# Patient Record
Sex: FEMALE | Race: WHITE | NOT HISPANIC OR LATINO | Employment: FULL TIME | URBAN - METROPOLITAN AREA
[De-identification: names, ages, dates, MRNs, and addresses within clinical notes are randomized per-mention and may not be internally consistent; named-entity substitution may affect disease eponyms.]

---

## 2024-06-24 ENCOUNTER — TELEPHONE (OUTPATIENT)
Age: 54
End: 2024-06-24

## 2024-06-24 NOTE — TELEPHONE ENCOUNTER
Received call from patient asking to [facility] consult with Dr Sarmiento for a double mastectomy. She stated that she carries the BCRA gene.

## 2024-06-25 ENCOUNTER — TELEPHONE (OUTPATIENT)
Dept: PLASTIC SURGERY | Facility: CLINIC | Age: 54
End: 2024-06-25

## 2024-07-05 ENCOUNTER — CONSULT (OUTPATIENT)
Dept: PLASTIC SURGERY | Facility: CLINIC | Age: 54
End: 2024-07-05

## 2024-07-05 VITALS
BODY MASS INDEX: 39.32 KG/M2 | TEMPERATURE: 99.3 F | HEIGHT: 65 IN | SYSTOLIC BLOOD PRESSURE: 124 MMHG | DIASTOLIC BLOOD PRESSURE: 80 MMHG | WEIGHT: 236 LBS

## 2024-07-05 DIAGNOSIS — Z15.02 BRCA1 GENE MUTATION POSITIVE IN FEMALE: Primary | ICD-10-CM

## 2024-07-05 DIAGNOSIS — Z85.3 HISTORY OF LEFT BREAST CANCER: ICD-10-CM

## 2024-07-05 DIAGNOSIS — Z98.890 S/P BILATERAL BREAST REDUCTION: ICD-10-CM

## 2024-07-05 DIAGNOSIS — Z15.01 BRCA1 GENE MUTATION POSITIVE IN FEMALE: Primary | ICD-10-CM

## 2024-07-05 DIAGNOSIS — Z15.09 BRCA1 GENE MUTATION POSITIVE IN FEMALE: Primary | ICD-10-CM

## 2024-07-05 DIAGNOSIS — Z92.3 HISTORY OF RADIATION THERAPY: ICD-10-CM

## 2024-07-05 PROCEDURE — 99205 OFFICE O/P NEW HI 60 MIN: CPT | Performed by: PLASTIC SURGERY

## 2024-07-05 RX ORDER — FLUTICASONE PROPIONATE 50 MCG
2 SPRAY, SUSPENSION (ML) NASAL DAILY
COMMUNITY
Start: 2024-04-22

## 2024-07-05 RX ORDER — LISINOPRIL 10 MG/1
10 TABLET ORAL DAILY
COMMUNITY
Start: 2024-05-09

## 2024-07-05 RX ORDER — CIPROFLOXACIN 250 MG/1
250 TABLET, FILM COATED ORAL EVERY 12 HOURS SCHEDULED
COMMUNITY
Start: 2024-05-29

## 2024-07-05 RX ORDER — LEVOTHYROXINE SODIUM 0.03 MG/1
25 TABLET ORAL DAILY
COMMUNITY
Start: 2024-04-13

## 2024-07-05 RX ORDER — LORAZEPAM 0.5 MG/1
1 TABLET ORAL DAILY PRN
COMMUNITY
Start: 2024-05-28

## 2024-07-05 RX ORDER — SERTRALINE HYDROCHLORIDE 25 MG/1
25 TABLET, FILM COATED ORAL DAILY
COMMUNITY
Start: 2024-06-21

## 2024-07-05 RX ORDER — SERTRALINE HYDROCHLORIDE 100 MG/1
100 TABLET, FILM COATED ORAL DAILY
COMMUNITY
Start: 2024-05-14

## 2024-07-05 NOTE — PROGRESS NOTES
Plastic Surgery H&P  [facility]   [address] [place] [address]        Assessment/Plan:    Assessment:   1. BRCA 1 gene mutation  2. Hx of left breast cancer S/P lumpectomy and RT  3. S/P B/L breast reduction        Plan:   I had a lengthy discussion with the patient regarding her reconstructive options. We mainly discussed implant based reconstruction, as she had  previously tissue expander reconstruction with [facility].  I discussed immediate versus delayed versus no reconstruction/use of prosthetics.  I discussed the breast reconstruction is a multi staged process.  I discussed multiple variations of each type.  I then discussed the potential risks, benefits and alternatives, including operative and recovery time of each procedure.  Risks discussed included, but were not limited to: bleeding, infection, scarring, poor wound healing, demonstrating underlying structures, need for further surgery, need for multiple procedures, mastectomy flap necrosis, partial mastectomy flap necrosis, the off-label use of acellular dermal matrix, the risk of breast implant associated anaplastic large cell lymphoma, the possible need for the expansion process, the risk of seroma, the risk of DVT, risk of asymmetry, the need for multiple procedures, the need for revision, poor aesthetic result, asymmetry.  The patient has had a prior breast reduction and desires nipple preservation. I believe is an appropriate candidate for nipple sparing mastectomies with pre-pectoral direct to implant reconstruction. She understands the likely need for secondary surgery for revisions, fat grafting, etc.  The patient noted her understanding. All the patient's questions were answered to her satisfaction.  She desires to proceed. I will refer her to breast surg onc for evaluation and coordination. We will work on scheduling. She is always welcome to call or return with any additional questions or  concerns.    Subjective      Sarah Singh is a 54 y.o. female who presents for evaluation for possible breast reconstruction. She is known to me from [facility]. 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 Patient underwent left partial mastectomy and SLNB on 3/2/17. She then received adjuvant whole breast RT from 4/3/17-17.   Patient is S/P B/L risk reducing breast reduction on 10/13/20. The patient is doing well. She follows with Surgical Oncology for surveillance and undergoes yearly mammograms and breast MRIs. Due to her high risk for breast cancer, patient is interested in prophylactic bilateral mastectomies with breast reconstruction. She states she is finally ready and was scheduled at [facility], but was uncomfortable with the reconstructive plan of skin sparing mastectomies with tissue expanders. She is interested in nipple sparing mastectomies. Patient has no related complaints. She denies new breast masses and breast pain. Patient has a PMH of hypothyroidism and HTN, anxiety/depression due to death of her son 2 years ago. She did have an abnormal EKG during her pre-op work up. She is seeing Cardiology, her stress test was normal. ECHO pending. She denies chest pain, SOB.     Past Medical History:   Diagnosis Date   BRCA1 gene mutation positive 2017   Breast cancer (HCC) 2017   lt ca with rad DCIS   DCIS (ductal carcinoma in situ) of breast   Hypothyroid   Sigmoid diverticulitis     Past Surgical History:   Procedure Laterality Date    DELIVERY      COLONOSCOPY, DIAGNOSTIC (RECTUM) 2020   COLONOSCOPY FLEXIBLE PROXIMAL DIAGNOSTIC performed by Lan Holly MD at ENDOSCOPY [facility]   COLONOSCOPY, DIAGNOSTIC (RECTUM) 2021   COLONOSCOPY FLEXIBLE PROXIMAL DIAGNOSTIC performed by Lan Holly MD at ENDOSCOPY [facility]   DILATION AND CURETTAGE (D&C)    for 40 day mesnes   EXC BREAST LESION RADMARK Left  3/2/2017   EXCISION OF BREAST LESION RADIOLOGICAL MARKER performed by Diane Bright DO at OR [facility]   LAPAROSCOPY; CHOLECYSTECTOMY 2010   no complications   LAPAROSCOPY;RMV ADNEXAL STRUCT Bilateral 8/31/2017   LAPAROSCOPIC OOPHORECTOMY AND OR SALPINGECTOMY performed by Lan Palm MD at OR [facility]   MASTECTOMY, PARTIAL Left 3/2/2017   MASTECTOMY PARTIAL performed by Diane Bright DO at OR [facility]   RADIATION THERAPY Left 2017   REDUCTION OF BREAST Bilateral 10/13/2020   REDUCTION MAMMOPLASTY performed by Savannah Sarmiento MD at OR [facility]   REMOVAL OF APPENDIX   Appendectomy       Current Outpatient Medications:   •  ciprofloxacin (CIPRO) 250 mg tablet, Take 250 mg by mouth every 12 (twelve) hours, Disp: , Rfl:   •  fluticasone (FLONASE) 50 mcg/act nasal spray, 2 sprays into each nostril daily, Disp: , Rfl:   •  levothyroxine 25 mcg tablet, Take 25 mcg by mouth daily, Disp: , Rfl:   •  lisinopril (ZESTRIL) 10 mg tablet, Take 10 mg by mouth daily, Disp: , Rfl:   •  LORazepam (ATIVAN) 0.5 mg tablet, Take 1 tablet by mouth daily as needed, Disp: , Rfl:   •  sertraline (ZOLOFT) 100 mg tablet, Take 100 mg by mouth daily, Disp: , Rfl:   •  sertraline (ZOLOFT) 25 mg tablet, Take 25 mg by mouth daily, Disp: , Rfl:       Allergies   Allergen Reactions   • Medical Tape Other (See Comments)     SKIN GETS REDDENED       Social History     Socioeconomic History   • Marital status: /Civil Union     Spouse name: Not on file   • Number of children: Not on file   • Years of education: Not on file   • Highest education level: Not on file   Occupational History   • Not on file   Tobacco Use   • Smoking status: Never   • Smokeless tobacco: Never   Vaping Use   • Vaping status: Never Used   Substance and Sexual Activity   • Alcohol use: Not Currently     Comment: rare   • Drug use: Not on file   • Sexual activity: Never   Other Topics Concern   • Not on file   Social History Narrative   • Not on file     Social  "Determinants of Health     Financial Resource Strain: Not on file   Food Insecurity: No Food Insecurity (9/13/2023)    Received from [facility]    Hunger Vital Sign    • Worried About Running Out of Food in the Last Year: Never true    • Ran Out of Food in the Last Year: Never true   Transportation Needs: Not on file   Physical Activity: Not on file   Stress: Not on file   Social Connections: Not on file   Intimate Partner Violence: Not on file   Housing Stability: Not on file     Family History   Problem Relation Name Age of Onset   Gastro-intestinal disorder Mother   colitis, diverticulitis that required colostomy, multiple polyps   Hypertension Father   BRCA1 Positive Father   No Known Problems Sister   No Known Problems Sister   No Known Problems Brother   Diabetes Grandmother (Maternal)   Glaucoma Grandmother (Maternal)   Stroke Grandmother (Maternal)   Cancer Grandfather (Maternal)   pancreatic   Cancer Grandmother (Paternal)   uterine, ?ovarian   BRCA1 Positive Grandmother (Paternal)   suspected carrier   No Known Problems Grandfather (Paternal)   Asthma Daughter   Asthma Daughter   No Known Problems Son   BRCA1 Positive Great-grandmother (Paternal)   Diabetes Aunt (Paternal)   Ovarian cancer Aunt (Paternal)   BRCA1 Positive Aunt (Paternal)     Review of Systems  Pertinent items are noted in HPI.      Objective     /80 (BP Location: Right arm, Patient Position: Sitting, Cuff Size: Large)   Temp 99.3 °F (37.4 °C) (Tympanic)   Ht 5' 5\" (1.651 m)   Wt 107 kg (236 lb)   BMI 39.27 kg/m²     General:  alert and oriented, in no acute distress   Skin:  normal and no rash or abnormalities   Eyes: conjunctivae/corneas clear. PERRL, EOM's intact. Fundi benign.   Mouth: MMM no lesions   Lymph Nodes:  Cervical, supraclavicular, and axillary nodes normal.   Lungs:  clear to auscultation bilaterally   Heart:  regular rate and rhythm, S1, S2 normal, no murmur, click, rub or gallop and regular rate and rhythm "   Abdomen: soft, non-tender; bowel sounds normal; no masses,  no organomegaly   CVA:  absent   Genitourinary: defer exam   Extremities:  extremities normal, warm and well-perfused; no cyanosis, clubbing, or edema   Neurologic:  Alert and oriented x3. Gait normal. Reflexes and motor strength normal and symmetric. Cranial nerves 2-12 and sensation grossly intact.   Psychiatric:  normal mood, behavior, speech, dress, and thought processes        B/L breasts: soft, no masses palp, no axillary adenopathy palp, large volume, grade I ptosis. Minimal radiation changes to left breast.     IMAGING:  MAMMOGRAM SCREENING SASHA BILATERAL     History   Visit for screening mammogram   Family medical history includes BRCA1 Positive in 4 relatives (aunt   (paternal), father, grandmother (paternal) (comments: suspected carrier),   great-grandmother (paternal)) and ovarian cancer in aunt (paternal).     Films Compared   05/15/2023 MAMMOGRAM SCREENING SASHA BILATERAL, 05/25/2022 MAMMOGRAM   DIAGNOSTIC SASHA BILATERAL, 05/13/2022 MAMMOGRAM SCREENING SASHA BILATERAL,   05/12/2021 MAMMOGRAM SCREENING SASHA BILATERAL, 02/12/2020 MAMMOGRAM   DIAGNOSTIC BILATERAL, 02/11/2019 MAMMOGRAM DIAGNOSTIC BILATERAL,   08/06/2018 MRI BREAST BILATERAL W WO CONTRAST, 02/13/2017 MAMMOGRAM,   DIAGNOSTIC, UNILAT, 02/08/2017 MAMMOGRAM, SCREENING, BILAT, and 02/20/2015   MAMMOGRAM, SCREENING, BILAT     Findings   Left   The left breast is almost entirely fatty.   There are post-surgical findings from a previous lumpectomy with radiation   seen in the left breast. There are postsurgical changes of left breast   reduction. There is no evidence of suspicious masses, calcifications, or   other abnormal findings in the left breast.     Right   The right breast is almost entirely fatty. There are postsurgical changes   of right breast reduction. There is no evidence of suspicious masses,   calcifications, or other abnormal findings in the right breast.     Impression    Bilateral   No mammographic evidence of malignancy.     BI-RADS® Category: 2 - Benign.     Recommendation   Screening mammogram in 1 year is recommended for both breasts.       A total of 60 mins was spent on the encounter, including reviewing the patient's records, documentation, and time spent in counseling coordination of care which represent greater than 50% of the visit. 35 mins was spent in counseling and discussion of surgical procedures.

## 2024-08-21 ENCOUNTER — PATIENT MESSAGE (OUTPATIENT)
Dept: PLASTIC SURGERY | Facility: CLINIC | Age: 54
End: 2024-08-21

## 2024-09-11 ENCOUNTER — TELEPHONE (OUTPATIENT)
Dept: HEMATOLOGY ONCOLOGY | Facility: CLINIC | Age: 54
End: 2024-09-11

## 2024-09-11 NOTE — TELEPHONE ENCOUNTER
Referral to Surgical Oncology received.  Chart reviewed by oncology nurse navigator at this time.      Diagnosis:   Diagnosis   Z15.01, Z15.02, Z15.09 (ICD-10-CM) - BRCA1 gene mutation positive in female     After review of chart, instructions for scheduling added to referral and sent to be scheduled as advised.    Due to diagnosis on patient's referral, no records retrieval needed by Oncology Navigation at this time.  Patient's genetic testing results are uploaded in the media tab for date 7-

## 2024-10-21 PROBLEM — Z15.09 MONOALLELIC MUTATION OF BRCA1 GENE: Status: ACTIVE | Noted: 2024-10-21

## 2024-10-21 PROBLEM — D05.12 DUCTAL CARCINOMA IN SITU (DCIS) OF LEFT BREAST: Status: ACTIVE | Noted: 2024-10-21

## 2024-10-21 PROBLEM — Z15.01 MONOALLELIC MUTATION OF BRCA1 GENE: Status: ACTIVE | Noted: 2024-10-21

## 2024-10-21 PROBLEM — Z86.000 HISTORY OF DUCTAL CARCINOMA IN SITU (DCIS) OF LEFT BREAST: Status: ACTIVE | Noted: 2024-10-21

## 2024-10-23 ENCOUNTER — OFFICE VISIT (OUTPATIENT)
Dept: SURGICAL ONCOLOGY | Facility: CLINIC | Age: 54
End: 2024-10-23
Payer: COMMERCIAL

## 2024-10-23 VITALS
DIASTOLIC BLOOD PRESSURE: 80 MMHG | HEIGHT: 65 IN | TEMPERATURE: 97.6 F | HEART RATE: 64 BPM | WEIGHT: 239 LBS | SYSTOLIC BLOOD PRESSURE: 128 MMHG | BODY MASS INDEX: 39.82 KG/M2 | OXYGEN SATURATION: 96 %

## 2024-10-23 DIAGNOSIS — Z15.01 BRCA1 POSITIVE: Primary | ICD-10-CM

## 2024-10-23 DIAGNOSIS — Z15.01 MONOALLELIC MUTATION OF BRCA1 GENE: ICD-10-CM

## 2024-10-23 DIAGNOSIS — Z15.09 MONOALLELIC MUTATION OF BRCA1 GENE: ICD-10-CM

## 2024-10-23 DIAGNOSIS — Z98.890 S/P LUMPECTOMY, LEFT BREAST: ICD-10-CM

## 2024-10-23 DIAGNOSIS — Z86.000 HISTORY OF DUCTAL CARCINOMA IN SITU (DCIS) OF LEFT BREAST: ICD-10-CM

## 2024-10-23 DIAGNOSIS — Z15.09 BRCA1 POSITIVE: Primary | ICD-10-CM

## 2024-10-23 DIAGNOSIS — Z98.890 S/P BILATERAL BREAST REDUCTION: ICD-10-CM

## 2024-10-23 DIAGNOSIS — Z92.3 HISTORY OF THERAPEUTIC RADIATION: ICD-10-CM

## 2024-10-23 PROCEDURE — 99205 OFFICE O/P NEW HI 60 MIN: CPT | Performed by: SURGERY

## 2024-10-23 RX ORDER — IBUPROFEN 200 MG
200 TABLET ORAL
COMMUNITY

## 2024-10-23 RX ORDER — CEFAZOLIN SODIUM 2 G/50ML
2000 SOLUTION INTRAVENOUS ONCE
OUTPATIENT
Start: 2024-10-23 | End: 2024-10-23

## 2024-10-23 RX ORDER — ALPRAZOLAM 0.25 MG/1
0.25 TABLET ORAL DAILY PRN
COMMUNITY
Start: 2024-09-25

## 2024-10-23 RX ORDER — ENOXAPARIN SODIUM 100 MG/ML
40 INJECTION SUBCUTANEOUS ONCE
OUTPATIENT
Start: 2024-10-23 | End: 2024-10-23

## 2024-10-23 NOTE — PROGRESS NOTES
Breast Consultation-Surgical Oncology     [address]  [facility]  [address]  [facility] [address]    Name:  Sarah Singh  YOB: 1970  MRN:  [id number]    Assessment & Plan   Diagnoses and all orders for this visit:    BRCA1 positive    Monoallelic mutation of BRCA1 gene  -     Incentive spirometry; Standing  -     Insert and maintain IV line; Standing  -     Void On-Call to O.R.; Standing  -     Place sequential compression device; Standing  -     Case request operating room: Bilateral mastectomy, possible nipple sparring; Standing  -     UA w Reflex to Microscopic w Reflex to Culture; Future  -     Comprehensive metabolic panel; Future  -     CBC and differential; Future  -     ceFAZolin (ANCEF) IVPB (premix in dextrose) 2,000 mg 50 mL  -     Case request operating room: Bilateral mastectomy, possible nipple sparring    History of ductal carcinoma in situ (DCIS) of left breast  -     Case request operating room: Bilateral mastectomy, possible nipple sparring; Standing  -     Case request operating room: Bilateral mastectomy, possible nipple sparring    History of therapeutic radiation    S/P lumpectomy, left breast    S/P bilateral breast reduction    Other orders    -     enoxaparin (LOVENOX) subcutaneous injection 40 mg          HPI: Sarah Singh is a 54 y.o. year old female who presents with a history of left breast carcinoma, DCIS.  She is status postlumpectomy and radiation therapy.  She denies the use of hormone therapy.  She had subsequent bilateral reduction surgery.  She is BRCA1 positive.  She is also status post bilateral oophorectomy.    Surgical treatment to date consisted of as noted.    Oncology History:    Oncology History   History of ductal carcinoma in situ (DCIS) of left breast   2/14/2017 Biopsy    Left breast stereotactic biopsy ([facility])  Lower outer quadrant  Ductal carcinoma in situ with concern for  microinvasion  Grade 2  ER <1, AZ <1     3/2/2017 Surgery    Left breast needle localized lumpectomy (Dr. 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 Cheri Pandey at [facility]     7/2017 Genetic Testing    [employer] panel - BRCA1 mutation positive         Pertinent reproductive history:  Age at menarche:    OB History    No obstetric history on file.           Problem List:   Patient Active Problem List   Diagnosis    BRCA1 positive    History of ductal carcinoma in situ (DCIS) of left breast    History of therapeutic radiation    S/P lumpectomy, left breast    S/P bilateral breast reduction     History reviewed. No pertinent past medical history.  Past Surgical History:   Procedure Laterality Date    US GUIDED BREAST BIOPSY LEFT COMPLETE Left 5/31/2022     History reviewed. No pertinent family history.  Social History     Socioeconomic History    Marital status: /Civil Union     Spouse name: Not on file    Number of children: Not on file    Years of education: Not on file    Highest education level: Not on file   Occupational History    Not on file   Tobacco Use    Smoking status: Never    Smokeless tobacco: Never   Vaping Use    Vaping status: Never Used   Substance and Sexual Activity    Alcohol use: Not Currently     Comment: rare    Drug use: Not on file    Sexual activity: Never   Other Topics Concern    Not on file   Social History Narrative    Not on file     Social Determinants of Health     Financial Resource Strain: Low Risk  (9/13/2023)    Received from [facility]    Financial Resource Strain     Do you have any trouble paying for your medications, or do you think you might in the future?:  No     Does your family have trouble paying for medicine? (Household - for ages 0-17 years): Not on file   Food Insecurity: No Food Insecurity (9/13/2023)    Received from [email or web address]    Food Insecurity     Do you need food for this week?: No     Are you able to get enough food for your family? (Household - for ages 0-17 years): Not on file     Does your family need food this week? (Household - for ages 0-17 years): Not on file     Do you always have enough food for your family? (Household - for ages 0-17 years): Not on file   Transportation Needs: No Transportation Needs (9/13/2023)    Received from [email or web address]    Transportation Needs     READ ONLY Do you have trouble getting a ride to medical visits or work?: Never True     Does your family have a hard time getting a ride to doctors’ visits? (Household - for ages 0-17 years): Not on file     Has lack of transportation kept you from medical appointments, meetings, work, or from getting things needed for daily living? Check all that apply. (Adult - for ages 18 years and over): Not on file     Do you (or your family) have trouble finding or paying for a ride (transportation)? (Household - for ages 0-17 years): Not on file   Physical Activity: Not on file   Stress: Not on file   Social Connections: Socially Integrated (9/13/2023)    Received from [email or web address]    Social Connections     How often do you feel lonely or isolated from those around you?: Rarely   Intimate Partner Violence: Not on file   Housing Stability: Low Risk  (9/13/2023)    Received from [email or web address]    Housing Stability     Do you currently live in a shelter or have no steady place to sleep at night?: No     READ ONLY Do you think you are at risk of becoming homeless?: No     Does your family worry about paying for your home or becoming homeless? (Household - for ages 0-17 years): Not on file     Are you homeless or worried that you might be in the future? (Adult - for ages 18 years and over): Not on file      Are you (or your family) homeless or worried that you might be in the future? (Household - for ages 0-17 years): Not on file     Current Outpatient Medications   Medication Sig Dispense Refill    ALPRAZolam (XANAX) 0.25 mg tablet Take 0.25 mg by mouth daily as needed      fluticasone (FLONASE) 50 mcg/act nasal spray 2 sprays into each nostril daily      ibuprofen (MOTRIN) 200 mg tablet Take 200 mg by mouth      levothyroxine 25 mcg tablet Take 25 mcg by mouth daily      lisinopril (ZESTRIL) 10 mg tablet Take 10 mg by mouth daily      sertraline (ZOLOFT) 100 mg tablet Take 100 mg by mouth daily      sertraline (ZOLOFT) 25 mg tablet Take 50 mg by mouth daily      LORazepam (ATIVAN) 0.5 mg tablet Take 1 tablet by mouth daily as needed (Patient not taking: Reported on 10/23/2024)       No current facility-administered medications for this visit.     Allergies   Allergen Reactions    Medical Tape Other (See Comments)     SKIN GETS REDDENED         The following portions of the patient's history were reviewed and updated as appropriate: allergies, current medications, past family history, past medical history, past social history, past surgical history, and problem list.    Review of Systems:  Review of Systems   Constitutional: Negative.  Negative for appetite change, fever and unexpected weight change.   HENT: Negative.  Negative for trouble swallowing.    Eyes: Negative.    Respiratory: Negative.  Negative for cough and shortness of breath.    Cardiovascular: Negative.  Negative for chest pain.   Gastrointestinal: Negative.  Negative for abdominal pain, nausea and vomiting.   Endocrine: Negative.    Genitourinary: Negative.  Negative for dysuria.   Musculoskeletal: Negative.  Negative for arthralgias and myalgias.   Skin: Negative.    Allergic/Immunologic: Positive for environmental allergies.   Neurological: Negative.  Negative for headaches.   Hematological: Negative.  Negative for adenopathy. Does not bruise/bleed  easily.   Psychiatric/Behavioral: Negative.         Physical Exam:  Physical Exam  Constitutional:       General: She is not in acute distress.     Appearance: Normal appearance. She is well-developed. She is obese.   HENT:      Head: Normocephalic and atraumatic.   Cardiovascular:      Heart sounds: Normal heart sounds.   Pulmonary:      Breath sounds: Normal breath sounds.   Chest:   Breasts:     Breasts are asymmetrical (R>L).      Right: Skin change (reduction scar) present. No swelling, bleeding, inverted nipple, mass, nipple discharge or tenderness.      Left: Skin change (reducation scar, mild radiation changes) present. No swelling, bleeding, inverted nipple, mass, nipple discharge or tenderness.      Comments: Large ptotic breasts bilateral  Abdominal:      Palpations: Abdomen is soft.   Musculoskeletal:      Right lower leg: No edema.      Left lower leg: No edema.   Lymphadenopathy:      Upper Body:      Right upper body: No supraclavicular, axillary or pectoral adenopathy.      Left upper body: No supraclavicular, axillary or pectoral adenopathy.   Neurological:      Mental Status: She is alert and oriented to person, place, and time.   Psychiatric:         Mood and Affect: Mood normal.         Laboratory:  Genetic testing through [employer] shows a mutation in BRCA1    Imagin2024 bilateral 3D screening mammogram from [facility] was benign    1623 bilateral breast MRI was benign        Discussion/Summary: 54-year-old female with a history of left breast conservation including radiation therapy.  She had subsequent reduction surgery.  She is BRCA1 positive.  She is here today to discuss nipple sparing mastectomy.  She met with plastic surgery, Dr. Sarmiento who discussed bilateral nipple sparing mastectomy along with [place] flap reconstruction.  There is a surgery date held for .  There is no evidence of disease based on exam today.  Her imaging was benign.  She states that she does have an  upcoming MRI scheduled.  I will need to review these images prior to any surgery.  I agree with the recommendation for bilateral mastectomy with reconstruction.  I do have concerns about the nipple sparing portion however.  I discussed this frankly with the patient and gave her information from [email or web address] and highlighted the following section:  The following patients may not be offered the procedure:  Smokers  Patients with severe ptosis  (drooping) of the breast   Obese patients or patients with large breasts  Patients who have undergone previous radiation therapy  I do can have concerns regarding the large ptotic breasts as well as the prior radiation in regard to the flap in general as well as nipple viability.  The patient understands that if she does indeed have nipple sparing mastectomy that she should not expect any sensation or erectile function of the nipple.  I will reach out to Dr. Sarmiento to discuss skin sparing versus nipple sparing further.  The patient states if we ultimately decide to proceed with skin sparing that she is agreeable to proceeding in that fashion.  All of her questions were answered.  Consent was signed today in the office for bilateral mastectomy, possible nipple sparing.  I stated she is scheduled for surgery in December.  Her breast MRI will need to be reviewed prior to this.

## 2024-11-01 ENCOUNTER — OFFICE VISIT (OUTPATIENT)
Dept: PLASTIC SURGERY | Facility: CLINIC | Age: 54
End: 2024-11-01
Payer: COMMERCIAL

## 2024-11-01 VITALS
HEIGHT: 65 IN | TEMPERATURE: 98 F | SYSTOLIC BLOOD PRESSURE: 120 MMHG | WEIGHT: 238.2 LBS | BODY MASS INDEX: 39.69 KG/M2 | DIASTOLIC BLOOD PRESSURE: 80 MMHG

## 2024-11-01 DIAGNOSIS — Z15.09 BRCA1 GENE MUTATION POSITIVE IN FEMALE: Primary | ICD-10-CM

## 2024-11-01 DIAGNOSIS — Z85.3 HISTORY OF LEFT BREAST CANCER: ICD-10-CM

## 2024-11-01 DIAGNOSIS — Z15.01 BRCA1 GENE MUTATION POSITIVE IN FEMALE: Primary | ICD-10-CM

## 2024-11-01 DIAGNOSIS — Z15.02 BRCA1 GENE MUTATION POSITIVE IN FEMALE: Primary | ICD-10-CM

## 2024-11-01 PROCEDURE — 99215 OFFICE O/P EST HI 40 MIN: CPT | Performed by: PLASTIC SURGERY

## 2024-11-01 NOTE — PROGRESS NOTES
Plastic Surgery H&P  [facility] Plastic  [facility] Reconstructive Surgery   [address] [place] [address]           Assessment/Plan:     Assessment:     Assessment  1. BRCA 1 gene mutation  2. Hx of left breast cancer S/P lumpectomy and RT  3. S/P B/L breast reduction  4. Encounter for breast reconstruction           Plan:  Patient presents for surgical planning for B/L JC free flap reconstruction. All details of the surgical procedure were discussed at length including operative and recovery time. Risks, benefits, and alternatives of the procedure were discussed. Surgical risks discussed inlcuded, but were not limited to: infection, bleeding, scarring, hematoma, seroma, partial or total flap loss, venous or arterial thrombosis requiring return to OR, delayed healing, chronic open wound, bulge or hernia, asymmetry, poor cosmesis, loss of function, permanent numbness or hypersensitivity, need for additional procedure. The patient noted their understanding and had opportunity for questions. All were answered. Informed consent obtained. Pre and post op instructions discussed. Multimodal pain control discussed. Use of surgical drains and ALEX dressings discussed. Lab work ordered. CTA A/P ordered. Patient remains an appropriate candidate for surgery. Surgical risk factors include: obesity, patient has had a prior breast reduction and desires nipple preservation. She understands the likely need for secondary surgery for revisions, fat grafting, etc.  The patient noted her understanding. All the patient's questions were answered to her satisfaction.  She desires to proceed. She is always welcome to call or return with any additional questions or concerns.     Subjective      Sarah Singh is a 54 y.o. female who presents for pre-op evaluation for breast reconstruction. She has elected for B/L immediate JC flaps. 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 Patient underwent left partial mastectomy and SLNB on 3/2/17. She then received adjuvant whole breast RT from 4/3/17-17.   Patient is S/P B/L risk reducing breast reduction on 10/13/20. The patient is doing well. She follows with Surgical Oncology for surveillance and undergoes yearly mammograms and breast MRIs. Due to her high risk for breast cancer, patient is interested in prophylactic bilateral mastectomies with breast reconstruction. She states she is finally ready and was scheduled at [facility], but was uncomfortable with the reconstructive plan of skin sparing mastectomies with tissue expanders. She is interested in nipple sparing mastectomies. Patient has no related complaints. She denies new breast masses and breast pain. Patient has a PMH of hypothyroidism and HTN, anxiety/depression due to death of her son 2 years ago. She did have an abnormal EKG during her pre-op work up. She is seeing Cardiology, her stress test was normal. ECHO pending. She denies chest pain, SOB. Her next MRI screening is scheduled for 24.     Past Medical History:   Diagnosis Date   BRCA1 gene mutation positive 2017   Breast cancer (HCC) 2017   lt ca with rad DCIS   DCIS (ductal carcinoma in situ) of breast   Hypothyroid   Sigmoid diverticulitis      Past Surgical History:   Procedure Laterality Date    DELIVERY      COLONOSCOPY, DIAGNOSTIC (RECTUM) 2020   COLONOSCOPY FLEXIBLE PROXIMAL DIAGNOSTIC performed by Lan Holly MD at ENDOSCOPY [facility]   COLONOSCOPY, DIAGNOSTIC (RECTUM) 2021   COLONOSCOPY FLEXIBLE PROXIMAL DIAGNOSTIC performed by Lan Holly MD at ENDOSCOPY [facility]   DILATION AND CURETTAGE (D&C)    for 40 day mesnes   EXC BREAST LESION RADMARK Left 3/2/2017   EXCISION OF BREAST LESION RADIOLOGICAL MARKER performed by Diane Bright DO at OR [facility]   LAPAROSCOPY; CHOLECYSTECTOMY    no complications   LAPAROSCOPY;RMV ADNEXAL STRUCT Bilateral 2017    LAPAROSCOPIC OOPHORECTOMY AND OR SALPINGECTOMY performed by Lan Palm MD at OR [facility]   MASTECTOMY, PARTIAL Left 3/2/2017   MASTECTOMY PARTIAL performed by Diane Bright DO at OR [facility]   RADIATION THERAPY Left 2017   REDUCTION OF BREAST Bilateral 10/13/2020   REDUCTION MAMMOPLASTY performed by Savannah Sarmiento MD at OR [facility]   REMOVAL OF APPENDIX   Appendectomy        Current Medications      Current Outpatient Medications:     ciprofloxacin (CIPRO) 250 mg tablet, Take 250 mg by mouth every 12 (twelve) hours, Disp: , Rfl:     fluticasone (FLONASE) 50 mcg/act nasal spray, 2 sprays into each nostril daily, Disp: , Rfl:     levothyroxine 25 mcg tablet, Take 25 mcg by mouth daily, Disp: , Rfl:     lisinopril (ZESTRIL) 10 mg tablet, Take 10 mg by mouth daily, Disp: , Rfl:     LORazepam (ATIVAN) 0.5 mg tablet, Take 1 tablet by mouth daily as needed, Disp: , Rfl:     sertraline (ZOLOFT) 100 mg tablet, Take 100 mg by mouth daily, Disp: , Rfl:     sertraline (ZOLOFT) 25 mg tablet, Take 25 mg by mouth daily, Disp: , Rfl:                  Allergies   Allergen Reactions    Medical Tape Other (See Comments)       SKIN GETS REDDENED         Social History               Socioeconomic History    Marital status: /Civil Union       Spouse name: Not on file    Number of children: Not on file    Years of education: Not on file    Highest education level: Not on file   Occupational History    Not on file   Tobacco Use    Smoking status: Never    Smokeless tobacco: Never   Vaping Use    Vaping status: Never Used   Substance and Sexual Activity    Alcohol use: Not Currently       Comment: rare    Drug use: Not on file    Sexual activity: Never   Other Topics Concern    Not on file   Social History Narrative    Not on file      Social Determinants of Health           Financial Resource Strain: Not on file   Food Insecurity: No Food Insecurity (9/13/2023)     Received from [facility]     Hunger Vital Sign      Worried About  "Running Out of Food in the Last Year: Never true      Ran Out of Food in the Last Year: Never true   Transportation Needs: Not on file   Physical Activity: Not on file   Stress: Not on file   Social Connections: Not on file   Intimate Partner Violence: Not on file   Housing Stability: Not on file         Family History   Problem Relation Name Age of Onset   Gastro-intestinal disorder Mother   colitis, diverticulitis that required colostomy, multiple polyps   Hypertension Father   BRCA1 Positive Father   No Known Problems Sister   No Known Problems Sister   No Known Problems Brother   Diabetes Grandmother (Maternal)   Glaucoma Grandmother (Maternal)   Stroke Grandmother (Maternal)   Cancer Grandfather (Maternal)   pancreatic   Cancer Grandmother (Paternal)   uterine, ?ovarian   BRCA1 Positive Grandmother (Paternal)   suspected carrier   No Known Problems Grandfather (Paternal)   Asthma Daughter   Asthma Daughter   No Known Problems Son   BRCA1 Positive Great-grandmother (Paternal)   Diabetes Aunt (Paternal)   Ovarian cancer Aunt (Paternal)   BRCA1 Positive Aunt (Paternal)      Review of Systems  Pertinent items are noted in HPI.        Objective        Objective  /80 (BP Location: Right arm, Patient Position: Sitting, Cuff Size: Standard)   Temp 98 °F (36.7 °C) (Tympanic)   Ht 5' 5\" (1.651 m)   Wt 108 kg (238 lb 3.2 oz)   BMI 39.64 kg/m²        General:  alert and oriented, in no acute distress   Skin:  normal and no rash or abnormalities   Eyes: conjunctivae/corneas clear. PERRL, EOM's intact. Fundi benign.   Mouth: MMM no lesions   Lymph Nodes:  Cervical, supraclavicular, and axillary nodes normal.   Lungs:  clear to auscultation bilaterally   Heart:  regular rate and rhythm, S1, S2 normal, no murmur, click, rub or gallop and regular rate and rhythm   Abdomen: soft, non-tender; bowel sounds normal; no masses,  no organomegaly   CVA:  absent   Genitourinary: defer exam   Extremities:  extremities normal, " warm and well-perfused; no cyanosis, clubbing, or edema   Neurologic:  Alert and oriented x3. Gait normal. Reflexes and motor strength normal and symmetric. Cranial nerves 2-12 and sensation grossly intact.   Psychiatric:  normal mood, behavior, speech, dress, and thought processes            B/L breasts: soft, no masses palp, no axillary adenopathy palp, large volume, grade I ptosis. Minimal radiation changes to left breast.      IMAGING:  MAMMOGRAM SCREENING SASHA BILATERAL     History   Visit for screening mammogram   Family medical history includes BRCA1 Positive in 4 relatives (aunt   (paternal), father, grandmother (paternal) (comments: suspected carrier),   great-grandmother (paternal)) and ovarian cancer in aunt (paternal).     Films Compared   05/15/2023 MAMMOGRAM SCREENING SASHA BILATERAL, 05/25/2022 MAMMOGRAM   DIAGNOSTIC SASHA BILATERAL, 05/13/2022 MAMMOGRAM SCREENING SASHA BILATERAL,   05/12/2021 MAMMOGRAM SCREENING SASHA BILATERAL, 02/12/2020 MAMMOGRAM   DIAGNOSTIC BILATERAL, 02/11/2019 MAMMOGRAM DIAGNOSTIC BILATERAL,   08/06/2018 MRI BREAST BILATERAL W WO CONTRAST, 02/13/2017 MAMMOGRAM,   DIAGNOSTIC, UNILAT, 02/08/2017 MAMMOGRAM, SCREENING, BILAT, and 02/20/2015   MAMMOGRAM, SCREENING, BILAT     Findings   Left   The left breast is almost entirely fatty.   There are post-surgical findings from a previous lumpectomy with radiation   seen in the left breast. There are postsurgical changes of left breast   reduction. There is no evidence of suspicious masses, calcifications, or   other abnormal findings in the left breast.     Right   The right breast is almost entirely fatty. There are postsurgical changes   of right breast reduction. There is no evidence of suspicious masses,   calcifications, or other abnormal findings in the right breast.     Impression   Bilateral   No mammographic evidence of malignancy.     BI-RADS® Category: 2 - Benign.     Recommendation   Screening mammogram in 1 year is recommended  for both breasts.         A total of 60 mins was spent on the encounter, including reviewing the patient's records, documentation, and time spent in counseling coordination of care which represent greater than 50% of the visit. 45 mins was spent in counseling and discussion of surgical procedures.

## 2024-11-05 ENCOUNTER — TELEPHONE (OUTPATIENT)
Dept: SURGICAL ONCOLOGY | Facility: CLINIC | Age: 54
End: 2024-11-05

## 2024-11-05 NOTE — TELEPHONE ENCOUNTER
Called pt at this time and LM for her to call back to let us know when MRI date is so we can get her scheduled accordingly for pre op appt with Dr Mclaughlin.

## 2024-11-06 ENCOUNTER — TELEPHONE (OUTPATIENT)
Dept: SURGICAL ONCOLOGY | Facility: CLINIC | Age: 54
End: 2024-11-06

## 2024-11-06 NOTE — TELEPHONE ENCOUNTER
Returned the patient's phone call. Confirmed that her breast MRI has been rescheduled to 11/21 at [facility]. She accepted a pre-op appointment with Dr. Mclaughlin on 11/25 at 2:00 and she verified appointment details.

## 2024-11-06 NOTE — TELEPHONE ENCOUNTER
Called the patient to further discuss her breast MRI timing and her surgery. The patient is aware of her surgery date of 12/09 with Dr. Mclaughlin and Dr. Sarmiento. Explained that if her breast MRI is done 5 days prior to surgery, it does not give enough time for any additional testing to be done IF it were to come back abnormal. The patient was instructed to contact [facility] to move her breast MRI up to a sooner date; which she stated she would do immediately. Requested that the patient call back once her MRI was rescheduled to inform the hope line of her new MRI date. Explained that Dr. Mclaughlin recommended seeing her back in the office for another appointment prior to surgery to review the MRI results and discuss the nipple sparing portion. Will call the patient back at a later time to further discuss once her MRI has been moved up.

## 2024-11-06 NOTE — TELEPHONE ENCOUNTER
Patient returning Mariza RN's call. Patient was able to move MRI up to 11/21/24. Sent Teams message to Mariza but unable to reach her. Will send to clinical pool. Patient will be available by phone the rest of the day.

## 2024-11-09 ENCOUNTER — APPOINTMENT (OUTPATIENT)
Dept: LAB | Facility: CLINIC | Age: 54
End: 2024-11-09
Payer: COMMERCIAL

## 2024-11-09 DIAGNOSIS — Z15.02 BRCA1 GENE MUTATION POSITIVE IN FEMALE: ICD-10-CM

## 2024-11-09 DIAGNOSIS — Z15.09 BRCA1 GENE MUTATION POSITIVE IN FEMALE: ICD-10-CM

## 2024-11-09 DIAGNOSIS — Z15.01 MONOALLELIC MUTATION OF BRCA1 GENE: ICD-10-CM

## 2024-11-09 DIAGNOSIS — Z15.01 BRCA1 GENE MUTATION POSITIVE IN FEMALE: ICD-10-CM

## 2024-11-09 DIAGNOSIS — Z15.09 MONOALLELIC MUTATION OF BRCA1 GENE: ICD-10-CM

## 2024-11-09 LAB
ALBUMIN SERPL BCG-MCNC: 4.5 G/DL (ref 3.5–5)
ALP SERPL-CCNC: 69 U/L (ref 34–104)
ALT SERPL W P-5'-P-CCNC: 18 U/L (ref 7–52)
ANION GAP SERPL CALCULATED.3IONS-SCNC: 8 MMOL/L (ref 4–13)
AST SERPL W P-5'-P-CCNC: 19 U/L (ref 13–39)
BACTERIA UR QL AUTO: ABNORMAL /HPF
BASOPHILS # BLD AUTO: 0.06 THOUSANDS/ÂΜL (ref 0–0.1)
BASOPHILS NFR BLD AUTO: 1 % (ref 0–1)
BILIRUB SERPL-MCNC: 0.33 MG/DL (ref 0.2–1)
BILIRUB UR QL STRIP: NEGATIVE
BUN SERPL-MCNC: 18 MG/DL (ref 5–25)
CALCIUM SERPL-MCNC: 9.3 MG/DL (ref 8.4–10.2)
CHLORIDE SERPL-SCNC: 105 MMOL/L (ref 96–108)
CLARITY UR: CLEAR
CO2 SERPL-SCNC: 28 MMOL/L (ref 21–32)
COLOR UR: ABNORMAL
CREAT SERPL-MCNC: 0.69 MG/DL (ref 0.6–1.3)
EOSINOPHIL # BLD AUTO: 0.21 THOUSAND/ÂΜL (ref 0–0.61)
EOSINOPHIL NFR BLD AUTO: 4 % (ref 0–6)
ERYTHROCYTE [DISTWIDTH] IN BLOOD BY AUTOMATED COUNT: 13.2 % (ref 11.6–15.1)
GFR SERPL CREATININE-BSD FRML MDRD: 100 ML/MIN/1.73SQ M
GLUCOSE P FAST SERPL-MCNC: 96 MG/DL (ref 65–99)
GLUCOSE UR STRIP-MCNC: NEGATIVE MG/DL
HCT VFR BLD AUTO: 41.9 % (ref 34.8–46.1)
HGB BLD-MCNC: 13.8 G/DL (ref 11.5–15.4)
HGB UR QL STRIP.AUTO: NEGATIVE
IMM GRANULOCYTES # BLD AUTO: 0.02 THOUSAND/UL (ref 0–0.2)
IMM GRANULOCYTES NFR BLD AUTO: 0 % (ref 0–2)
KETONES UR STRIP-MCNC: NEGATIVE MG/DL
LEUKOCYTE ESTERASE UR QL STRIP: ABNORMAL
LYMPHOCYTES # BLD AUTO: 1.24 THOUSANDS/ÂΜL (ref 0.6–4.47)
LYMPHOCYTES NFR BLD AUTO: 21 % (ref 14–44)
MCH RBC QN AUTO: 29.9 PG (ref 26.8–34.3)
MCHC RBC AUTO-ENTMCNC: 32.9 G/DL (ref 31.4–37.4)
MCV RBC AUTO: 91 FL (ref 82–98)
MONOCYTES # BLD AUTO: 0.31 THOUSAND/ÂΜL (ref 0.17–1.22)
MONOCYTES NFR BLD AUTO: 5 % (ref 4–12)
MUCOUS THREADS UR QL AUTO: ABNORMAL
NEUTROPHILS # BLD AUTO: 3.98 THOUSANDS/ÂΜL (ref 1.85–7.62)
NEUTS SEG NFR BLD AUTO: 69 % (ref 43–75)
NITRITE UR QL STRIP: NEGATIVE
NON-SQ EPI CELLS URNS QL MICRO: ABNORMAL /HPF
NRBC BLD AUTO-RTO: 0 /100 WBCS
PH UR STRIP.AUTO: 7 [PH]
PLATELET # BLD AUTO: 272 THOUSANDS/UL (ref 149–390)
PMV BLD AUTO: 9.6 FL (ref 8.9–12.7)
POTASSIUM SERPL-SCNC: 4.5 MMOL/L (ref 3.5–5.3)
PROT SERPL-MCNC: 7.3 G/DL (ref 6.4–8.4)
PROT UR STRIP-MCNC: ABNORMAL MG/DL
RBC # BLD AUTO: 4.62 MILLION/UL (ref 3.81–5.12)
RBC #/AREA URNS AUTO: ABNORMAL /HPF
SODIUM SERPL-SCNC: 141 MMOL/L (ref 135–147)
SP GR UR STRIP.AUTO: 1.02 (ref 1–1.03)
T4 FREE SERPL-MCNC: 0.66 NG/DL (ref 0.61–1.12)
TSH SERPL DL<=0.05 MIU/L-ACNC: 2.3 UIU/ML (ref 0.45–4.5)
UROBILINOGEN UR STRIP-ACNC: <2 MG/DL
WBC # BLD AUTO: 5.82 THOUSAND/UL (ref 4.31–10.16)
WBC #/AREA URNS AUTO: ABNORMAL /HPF

## 2024-11-09 PROCEDURE — 80053 COMPREHEN METABOLIC PANEL: CPT

## 2024-11-09 PROCEDURE — 36415 COLL VENOUS BLD VENIPUNCTURE: CPT

## 2024-11-09 PROCEDURE — 84439 ASSAY OF FREE THYROXINE: CPT

## 2024-11-09 PROCEDURE — 81001 URINALYSIS AUTO W/SCOPE: CPT

## 2024-11-09 PROCEDURE — 85025 COMPLETE CBC W/AUTO DIFF WBC: CPT

## 2024-11-09 PROCEDURE — 84443 ASSAY THYROID STIM HORMONE: CPT

## 2024-11-11 DIAGNOSIS — Z01.818 PRE-OP TESTING: Primary | ICD-10-CM

## 2024-11-15 ENCOUNTER — HOSPITAL ENCOUNTER (OUTPATIENT)
Dept: CT IMAGING | Facility: CLINIC | Age: 54
Discharge: HOME | End: 2024-11-15
Payer: COMMERCIAL

## 2024-11-15 ENCOUNTER — APPOINTMENT (OUTPATIENT)
Age: 54
End: 2024-11-15
Payer: COMMERCIAL

## 2024-11-15 DIAGNOSIS — Z15.09 BRCA1 GENE MUTATION POSITIVE IN FEMALE: ICD-10-CM

## 2024-11-15 DIAGNOSIS — Z01.818 PRE-OP TESTING: ICD-10-CM

## 2024-11-15 DIAGNOSIS — Z15.01 BRCA1 GENE MUTATION POSITIVE IN FEMALE: ICD-10-CM

## 2024-11-15 DIAGNOSIS — Z15.02 BRCA1 GENE MUTATION POSITIVE IN FEMALE: ICD-10-CM

## 2024-11-15 DIAGNOSIS — Z85.3 HISTORY OF LEFT BREAST CANCER: ICD-10-CM

## 2024-11-15 LAB
BACTERIA UR QL AUTO: NORMAL /HPF
BILIRUB UR QL STRIP: NEGATIVE
CLARITY UR: CLEAR
COLOR UR: ABNORMAL
GLUCOSE UR STRIP-MCNC: NEGATIVE MG/DL
HGB UR QL STRIP.AUTO: NEGATIVE
KETONES UR STRIP-MCNC: NEGATIVE MG/DL
LEUKOCYTE ESTERASE UR QL STRIP: ABNORMAL
NITRITE UR QL STRIP: NEGATIVE
NON-SQ EPI CELLS URNS QL MICRO: NORMAL /HPF
PH UR STRIP.AUTO: 7 [PH]
PROT UR STRIP-MCNC: NEGATIVE MG/DL
RBC #/AREA URNS AUTO: NORMAL /HPF
SP GR UR STRIP.AUTO: 1.01 (ref 1–1.03)
UROBILINOGEN UR STRIP-ACNC: <2 MG/DL
WBC #/AREA URNS AUTO: NORMAL /HPF

## 2024-11-15 PROCEDURE — 81001 URINALYSIS AUTO W/SCOPE: CPT

## 2024-11-15 PROCEDURE — 74174 CTA ABD&PLVS W/CONTRAST: CPT

## 2024-11-15 RX ADMIN — IOHEXOL 75 ML: 350 INJECTION, SOLUTION INTRAVENOUS at 09:26
